# Patient Record
Sex: FEMALE | Race: BLACK OR AFRICAN AMERICAN | URBAN - METROPOLITAN AREA
[De-identification: names, ages, dates, MRNs, and addresses within clinical notes are randomized per-mention and may not be internally consistent; named-entity substitution may affect disease eponyms.]

---

## 2017-01-05 ENCOUNTER — OFFICE VISIT (OUTPATIENT)
Dept: ORTHOPEDICS | Facility: CLINIC | Age: 32
End: 2017-01-05
Payer: COMMERCIAL

## 2017-01-05 ENCOUNTER — HOSPITAL ENCOUNTER (OUTPATIENT)
Dept: LAB | Facility: CLINIC | Age: 32
Discharge: HOME | End: 2017-01-05
Attending: FAMILY MEDICINE | Admitting: FAMILY MEDICINE
Payer: COMMERCIAL

## 2017-01-05 VITALS — BODY MASS INDEX: 22.5 KG/M2 | WEIGHT: 140 LBS | HEIGHT: 66 IN | RESPIRATION RATE: 13 BRPM

## 2017-01-05 DIAGNOSIS — M25.561 ACUTE PAIN OF RIGHT KNEE: ICD-10-CM

## 2017-01-05 DIAGNOSIS — M25.461 EFFUSION, RIGHT KNEE: ICD-10-CM

## 2017-01-05 DIAGNOSIS — M25.461 EFFUSION, RIGHT KNEE: Primary | ICD-10-CM

## 2017-01-05 LAB
APPEARANCE FLD: NORMAL
BASOPHILS NFR FLD MANUAL: 1 %
COLOR FLD: YELLOW
CRYSTALS SNV MICRO: NORMAL
EOSINOPHIL NFR FLD MANUAL: 4 %
LYMPHOCYTES NFR FLD MANUAL: 26 %
MONOS+MACROS NFR FLD MANUAL: 66 %
NEUTS BAND NFR FLD MANUAL: 3 %
SPECIMEN SOURCE FLD: NORMAL
WBC # FLD AUTO: NORMAL /UL

## 2017-01-05 PROCEDURE — 89060 EXAM SYNOVIAL FLUID CRYSTALS: CPT | Performed by: FAMILY MEDICINE

## 2017-01-05 PROCEDURE — 89051 BODY FLUID CELL COUNT: CPT | Performed by: FAMILY MEDICINE

## 2017-01-05 PROCEDURE — 99204 OFFICE O/P NEW MOD 45 MIN: CPT | Mod: 25 | Performed by: FAMILY MEDICINE

## 2017-01-05 PROCEDURE — 87070 CULTURE OTHR SPECIMN AEROBIC: CPT | Performed by: FAMILY MEDICINE

## 2017-01-05 PROCEDURE — 20611 DRAIN/INJ JOINT/BURSA W/US: CPT | Mod: RT | Performed by: FAMILY MEDICINE

## 2017-01-05 RX ORDER — METHYLPREDNISOLONE ACETATE 40 MG/ML
40 INJECTION, SUSPENSION INTRA-ARTICULAR; INTRALESIONAL; INTRAMUSCULAR; SOFT TISSUE ONCE
Qty: 1 ML | Refills: 0 | Status: CANCELLED | OUTPATIENT
Start: 2017-01-05 | End: 2017-01-05

## 2017-01-05 RX ORDER — NAPROXEN 500 MG/1
500 TABLET ORAL 2 TIMES DAILY WITH MEALS
Qty: 14 TABLET | Refills: 1 | Status: SHIPPED | OUTPATIENT
Start: 2017-01-05

## 2017-01-05 NOTE — PATIENT INSTRUCTIONS
Thank you for allowing us to participate in your care today.  Please find below your visit diagnosis and the plan going forward.    1. Effusion, right knee    2. Acute pain of right knee      Activity modification as discussed  Given a patellar stabilizing brace  Drainage of the right knee was performed today in clinic. Fluid was sent to the lab for analysis.  Shown how to sign up for MyChart  If fluid is negative for gout or infection and pain is not improved then would recommend an MRI  Ok to use Ibuprofen for pain    Follow up in 1-2 weeks (sooner if needed; call direct clinic number [[phone]] at any time with questions or concerns)    Augustus Knight DO CAQSM  [facility]  Website: [email or web address]  Twitter: [email or web address]

## 2017-01-05 NOTE — Clinical Note
[facility]  [address]  [address]  [place] [address]  [phone]     January 5, 2017    Antonietta Hays  1985  [address]  [place] [address]      To Whom It May Concern:    Antonietta was seen in clinic today for    Effusion, right knee  Acute pain of right knee.     Antonietta may return to work on or about 1/5/17 with the following: Please allow her to rest her knee as needed through her work shift. She could also use a stool to help rest her knee as needed. These restrictions will be valid from 1/5/17 to 1/19/17.    Antonietta will schedule to follow up in clinic prior to the above mentioned end date for re-evaluation of    Effusion, right knee  Acute pain of right knee and review of work restrictions.    Please feel free to contact myself or my Clinic Coordinator, Mendoza Varma, with any questions or concerns at the above number.        Sincerely,            Augustus Knight DO, CAM  Mendoza Varma [facility], MAEd on behalf of Dr. Knight

## 2017-01-05 NOTE — PROGRESS NOTES
ASSESSMENT & PLAN    ICD-10-CM    1. Effusion, right knee M25.461 HC ARTHROCENTESIS ASPIR&/INJ MAJOR JT/BURSA W/US     naproxen (NAPROSYN) 500 MG tablet   2. Acute pain of right knee M25.561 HC ARTHROCENTESIS ASPIR&/INJ MAJOR JT/BURSA W/US     naproxen (NAPROSYN) 500 MG tablet   Activity modification as discussed  Given a patellar stabilizing brace. DC knee immobilizer.  Drainage of the right knee was performed today in clinic. Fluid was sent to the lab for analysis. No cortisone given the nature of the fluid.  Shown how to sign up for MyChart  If fluid is negative for gout or infection and pain is not improved then would recommend an MRI  Rx for Naproxen    Follow up in 1-2 weeks (sooner if needed; call direct clinic number [[phone]] at any time with questions or concerns).  Instructed to call the office if the condition evolves or worsens.    -----    SUBJECTIVE  Antonietta MARAH Hays is a/an 31 year old female who is seen in consultation at the request of Dr. Eliseo Morgan for evaluation of right knee pain. The patient is seen by themselves. An  was used over the phone through [email or web address].    Onset: 2 week(s) ago with increased pain last week. Reports insidious onset without acute precipitating event. She points to the outside of the knee as her spot of pain and she indicates swelling.   Worsened by: standing, She has trouble sleeping due to pain  Better with: unknown  Quality: aching, dull  Pain Scale (maximum/current)/10: 6/10 / 3/10  Treatments tried: ibuprofen, ice  Orthopedic history: NO  Relevant surgical history: NO  Patient Social History: works at coffee shop as a [employer]    Patient's past medical, surgical, social, and family histories were reviewed today and no changes are noted.    REVIEW OF SYSTEMS:  10 point ROS is negative other than symptoms noted above in HPI, Past Medical History or as stated below  Constitutional: NEGATIVE for fever, chills, change in weight  Skin:  "NEGATIVE for worrisome rashes, moles or lesions  GI/: NEGATIVE for bowel or bladder changes  Neuro: NEGATIVE for weakness, dizziness or paresthesias    OBJECTIVE:  Resp 13  Ht 5' 6\" (1.676 m)  Wt 140 lb (63.504 kg)  BMI 22.61 kg/m2  LMP 11/18/2016   General: healthy, alert and in no distress  HEENT: no scleral icterus or conjunctival erythema  Skin: no suspicious lesions or rash. No jaundice.  CV: no pedal edema  Resp: normal respiratory effort without conversational dyspnea   Psych: normal mood and affect  Gait: normal steady gait with appropriate coordination and balance  Neuro: Motor strength as noted below  MSK:  RIGHT KNEE  Inspection:    effusion  Palpation:    Tender about the lateral patellar facet, medial patellar facet and lateral femoral condyle. Remainder of bony and ligamentous landmarks are nontender.    Mild effusion is present    Patellofemoral crepitus is Absent, patellar is balottable  Range of Motion:     -50 extension to 1100 flexion  Strength:    Extensor mechanism intact  Special Tests:    Positive: Patellar grind    Independent visualization of the below image:  KNEE THREE VIEWS RIGHT  12/29/2016 6:45 PM       HISTORY: Right knee pain for three days.  No known injury.  Has  moderate effusion, mild increased warmth.  No fevers.  Pain in right  knee.     COMPARISON: None.     FINDINGS: There is no significant degenerative change. No  suprapatellar effusion. There is no acute fracture. No dislocation.  Sunrise views appear aligned.  There are no worrisome bony lesions.                                                                       IMPRESSION:  No acute osseous abnormality demonstrated.     CHARISSA KNIGHT MD    Right Knee Intra-Articular Aspiration     Diagnoses (preoperative and postoperative):  Right knee pain.  Current Procedure (include preoperative):  Sonographically guided right knee intra-articular aspiration  Current Indication (include preoperative):  Alleviation of " pain  REASON FOR REFERRAL:  Antonietta has requested a right knee intra-articular aspiration to help with modulation of pain. Sonographic guidance will be used to ensure accurate placement of the medication within the joint space as the patient's landmarks are not easily palpated.  PATIENT EDUCATION:  Ready to learn with no apparent learning barriers identified.  Learning preferences include listening. Explained diagnosis and treatment plan as well as treatment alternatives. Patient expressed understanding of the content.  Following denial of allergy and review of potential side effects and complications including but not necessarily limited to infection, bleeding, allergic reaction, post-injection flare, local tissue breakdown, injury to soft tissue and/or nerves and seizure, patient indicated their understanding and agreed to proceed. A written consent was obtained and is scanned into the chart. Written and signed consent obtained and is scanned into the chart.  PROCEDURE:  Prior to the procedure, the right knee joint was examined with a 12 MHz linear transducer to visualize the joint space and determine the approach for the procedure.  Procedure was carried out using sterile technique including Chloraprep scrub, a sterile transducer cover, and sterile transducer gel. A simple surgical tray was used.  PROCEDURAL PAUSE:  Procedural pause conducted to verify correct patient identity, procedure to be performed, and as applicable, correct side/site, correct patient position, availability of implants, special equipment, or special requirements.  Patient position:  Supine  Transducer type:  12 MHz linear array transducer  Approach: Lateral to medial parallel to long axis of transducer  Local Anesthesia:  22 gauge 2.5 inch needle was used to anesthetize the skin and subcutaneous tissue with 5 ml of 1% Lidocaine  Aspiration: Sonographically guided 18 gauge 3.5 inch needle was directly visualized entering down into the right  knee joint.  After confirming needle tip position approximately 13 cc of cloudy fluid was removed from the knee.  AFTERCARE:  Patient tolerated the procedure without complication. After a short observation period, the patient was discharged under their own power and in excellent condition.  Pain noted to be a 3/10 before completion of the procedure and 1/10 after completion of the procedure.    Patient's conditions were thoroughly discussed during today's visit with greater than 50% of the visit spent counseling the patient with total time spent face-to-face with the patient being 25 minutes.    Augustus Knight DO [facility]

## 2017-01-05 NOTE — MR AVS SNAPSHOT
After Visit Summary   1/5/2017    Antonietta Hays    MRN: [id number]           Patient Information     Date Of Birth          1985        Visit Information        Provider Department      1/5/2017 11:20 AM Augustus Knight DO [facility]        Today's Diagnoses     Effusion, right knee    -  1     Acute pain of right knee           Care Instructions    Thank you for allowing us to participate in your care today.  Please find below your visit diagnosis and the plan going forward.    1. Effusion, right knee    2. Acute pain of right knee      Activity modification as discussed  Given a patellar stabilizing brace  Drainage of the right knee was performed today in clinic. Fluid was sent to the lab for analysis.  Shown how to sign up for MyChart  If fluid is negative for gout or infection and pain is not improved then would recommend an MRI    Follow up in 1-2 weeks (sooner if needed; call direct clinic number [[phone]] at any time with questions or concerns)    Augustus Knight DO [facility]  Website: [email or web address]  Twitter: [email or web address]          Follow-ups after your visit        Who to contact     If you have questions or need follow up information about today's clinic visit or your schedule please contact [facility] directly at [phone].  Normal or non-critical lab and imaging results will be communicated to you by MyChart, letter or phone within 4 business days after the clinic has received the results. If you do not hear from us within 7 days, please contact the clinic through MyChart or phone. If you have a critical or abnormal lab result, we will notify you by phone as soon as possible.  Submit refill requests through [employer] or call your pharmacy and they will forward the refill request to us. Please allow 3 business days for your refill to be completed.          Additional Information About  "Your Visit        MyChart Information     NewComLink lets you send messages to your doctor, view your test results, renew your prescriptions, schedule appointments and more. To sign up, go to [email or web address] . Click on \"Log in\" on the left side of the screen, which will take you to the Welcome page. Then click on \"Sign up Now\" on the right side of the page.     You will be asked to enter the access code listed below, as well as some personal information. Please follow the directions to create your username and password.     Your access code is: [email or web address]  Expires: 3/29/2017  7:11 PM     Your access code will  in 90 days. If you need help or a new code, please call your [place] clinic or [phone].        Care EveryWhere ID     This is your Care EveryWhere ID. This could be used by other organizations to access your [facility] medical records  [id number]        Your Vitals Were     Respirations Height BMI (Body Mass Index) Last Period          13 5' 6\" (1.676 m) 22.61 kg/m2 2016         Blood Pressure from Last 3 Encounters:   16 127/89    Weight from Last 3 Encounters:   17 140 lb (63.504 kg)   16 140 lb (63.504 kg)              We Performed the Following     HC ARTHROCENTESIS ASPIR&/INJ MAJOR JT/BURSA W/US        Primary Care Provider    None       No address on file        Thank you!     Thank you for choosing [facility]  for your care. Our goal is always to provide you with excellent care. Hearing back from our patients is one way we can continue to improve our services. Please take a few minutes to complete the written survey that you may receive in the mail after your visit with us. Thank you!             Your Updated Medication List - Protect others around you: Learn how to safely use, store and throw away your medicines at www.disposemymeds.org.          This list is accurate as of: 17 12:06 PM.  Always use your most recent med list.          "          Brand Name Dispense Instructions for use    order for DME     1 Device    Equipment being ordered: knee immobilizer

## 2017-01-05 NOTE — Clinical Note
Antonietta Mcmullen Dr. saw me at [facility] on Jan 5, 2017.  Please refer to the visit note at your convenience and feel free to contact me should you have any questions.  Thank you for the consult. Sincerely,  Augustus Knight DO, [facility]

## 2017-01-05 NOTE — NURSING NOTE
"Chief Complaint   Patient presents with     Musculoskeletal Problem       Initial Resp 13  Ht 5' 6\" (1.676 m)  Wt 140 lb (63.504 kg)  BMI 22.61 kg/m2  LMP 11/18/2016 Estimated body mass index is 22.61 kg/(m^2) as calculated from the following:    Height as of this encounter: 5' 6\" (1.676 m).    Weight as of this encounter: 140 lb (63.504 kg).  BP completed using cuff size: NA (Not Taken)    Mendoza Varma ATC    "

## 2017-01-10 LAB
BACTERIA SPEC CULT: NO GROWTH
MICRO REPORT STATUS: NORMAL
SPECIMEN SOURCE: NORMAL

## 2017-01-19 ENCOUNTER — OFFICE VISIT (OUTPATIENT)
Dept: ORTHOPEDICS | Facility: CLINIC | Age: 32
End: 2017-01-19
Payer: COMMERCIAL

## 2017-01-19 VITALS
DIASTOLIC BLOOD PRESSURE: 84 MMHG | BODY MASS INDEX: 22.5 KG/M2 | SYSTOLIC BLOOD PRESSURE: 118 MMHG | HEIGHT: 66 IN | WEIGHT: 140 LBS

## 2017-01-19 DIAGNOSIS — L24.9 IRRITANT CONTACT DERMATITIS, UNSPECIFIED TRIGGER: Primary | ICD-10-CM

## 2017-01-19 DIAGNOSIS — M22.2X1 PATELLOFEMORAL STRESS SYNDROME OF RIGHT KNEE: ICD-10-CM

## 2017-01-19 PROCEDURE — 99213 OFFICE O/P EST LOW 20 MIN: CPT | Performed by: FAMILY MEDICINE

## 2017-01-19 NOTE — PATIENT INSTRUCTIONS
Thank you for allowing us to participate in your care today.  Please find below your visit diagnosis and the plan going forward.    1. Patellofemoral stress syndrome of right knee      Discussed your exam and recommendations  No imaging given that you've already improved  Referral made to physical therapy    Follow up in 4-6 PT sessions if still having pain / not back to your normal activity.  Sooner if needed. Call direct clinic number [[phone]] at any time with questions or concerns)    In regards to skin itching would recommend a topical cortisone (hydrocortisone) ointment    Augustus Knight DO CAQSM  [facility]  Website: [email or web address]  Twitter: [email or web address]

## 2017-01-19 NOTE — NURSING NOTE
"Chief Complaint   Patient presents with     RECHECK       Initial /84 mmHg  Ht 5' 6\" (1.676 m)  Wt 140 lb (63.504 kg)  BMI 22.61 kg/m2  LMP 11/18/2016 Estimated body mass index is 22.61 kg/(m^2) as calculated from the following:    Height as of this encounter: 5' 6\" (1.676 m).    Weight as of this encounter: 140 lb (63.504 kg).  BP completed using cuff size: osmar Varma ATC    "

## 2017-01-19 NOTE — PROGRESS NOTES
"ASSESSMENT & PLAN    ICD-10-CM    1. Irritant contact dermatitis, unspecified trigger L24.9    2. Patellofemoral stress syndrome of right knee M22.2X1 [facility] PT, HAND, AND CHIROPRACTIC REFERRAL   Doing well and pain is improved  Discussed exam and no imaging given improvement  Referral made to physical therapy    In regards to skin itching would recommend a topical cortisone (hydrocortisone) ointment. Follow-up with PCP if not improved.    Follow up in 4-6 PT sessions if still having pain / not back to your normal activity.  Sooner if needed. Call direct clinic number [[phone]] at any time with questions or concerns)Instructed to call the office if the condition evolves or worsens.    -----    SUBJECTIVE:  Antonietta Hays is a 31 year old female who is seen in follow-up for Effusion, right knee and acute pain of right knee. They were last seen 1/5/2017. She is seen with an .     Since their last visit reports 80% improvement. She reports that it is significantly better. They indicate that their pain level is 0/10. She still has some mild pain with prolonged standing. They have tried rest/activity avoidance.      Patient's past medical, surgical, social, and family histories were reviewed today and no changes are noted.    REVIEW OF SYSTEMS:  Constitutional: NEGATIVE for fever, chills, change in weight  Skin: NEGATIVE for worrisome rashes, moles or lesions  GI/: NEGATIVE for bowel or bladder changes  Neuro: NEGATIVE for weakness, dizziness or paresthesias    OBJECTIVE:  /84 mmHg  Ht 5' 6\" (1.676 m)  Wt 140 lb (63.504 kg)  BMI 22.61 kg/m2  [facility] 11/18/2016   General: healthy, alert and in no distress  HEENT: no scleral icterus or conjunctival erythema  Skin: Are of scaling / papular rash over lateral malleolus/distal fibula.  No jaundice.  CV: regular rhythm by palpation, no pedal edema  Resp: normal respiratory effort without conversational dyspnea   Psych: normal mood and affect  Gait: " normal steady gait with appropriate coordination and balance  Neuro: normal light touch sensory exam of the extremities.  Motor strength as noted below  MSK:  RIGHT KNEE  Inspection:    No effusion  Palpation:    Mildly tender about the lateral patellar facet, medial patellar facet. Remainder of bony and ligamentous landmarks are nontender.    Patellofemoral crepitus is present  Range of Motion:     00 extension to 1350 flexion  Strength:    Extensor mechanism intact  Special Tests:    Positive: Patellar grind    Independent visualization of the below image:  None indicated at this time    Patient's conditions were thoroughly discussed during today's visit with greater than 50% of the visit spent counseling the patient with total time spent face-to-face with the patient being 15 minutes.    Augustus Knight DO [facility]

## 2017-01-19 NOTE — MR AVS SNAPSHOT
After Visit Summary   1/19/2017    Antonietta Hays    MRN: [id number]           Patient Information     Date Of Birth          1985        Visit Information        Provider Department      1/19/2017 10:45 AM Augustus Knight DO; [employer] [facility] MEDIC[facility]        Today's Diagnoses     Patellofemoral stress syndrome of right knee           Care Instructions    Thank you for allowing us to participate in your care today.  Please find below your visit diagnosis and the plan going forward.    1. Patellofemoral stress syndrome of right knee      Discussed your exam and recommendations  No imaging given that you've already improved  Referral made to physical therapy    Follow up in 4-6 PT sessions if still having pain / not back to your normal activity.  Sooner if needed. Call direct clinic number [[phone]] at any time with questions or concerns)    In regards to skin itching would recommend a topical cortisone (hydrocortisone) ointment    Augustus Knight DO [facility]  Website: [email or web address]  Twitter: [email or web address]          Follow-ups after your visit        Additional Services     [facility] PT, HAND, AND CHIR[facility]RACTIC REFERRAL       **This order will print in the [facility] Scheduling Office**    Physical Therapy, Hand Therapy and Chiropractic Care are available through:    *[facility]  *[facility]  *[facility]    Call one number to schedule at any of the above locations: [phone].    Your provider has referred you to: Physical Therapy at [facility] or [facility]    Indication/Reason for Referral: Knee Pain - PFPS  Onset of Illness: see chart  Therapy Orders: Evaluate and Treat  Special Programs: None  Special Request: None    Ray Bell      Additional Comments for the Therapist or Chiropractor: Formal physical therapy - exercises to include neuromuscular/proprioceptive control  "and VMO strengthening. Please also include pain-free closed chain/isometric/isotonic strengthening with use of modalities (including kinesioptaping) as needed/deemed appropriate with home exercise prescription.        Please be aware that coverage of these services is subject to the terms and limitations of your health insurance plan.  Call member services at your health plan with any benefit or coverage questions.      Please bring the following to your appointment:    *Your personal calendar for scheduling future appointments  *Comfortable clothing                  Who to contact     If you have questions or need follow up information about today's clinic visit or your schedule please contact [facility] MEDICINE directly at [phone].  Normal or non-critical lab and imaging results will be communicated to you by Nichehart, letter or phone within 4 business days after the clinic has received the results. If you do not hear from us within 7 days, please contact the clinic through NetClarityt or phone. If you have a critical or abnormal lab result, we will notify you by phone as soon as possible.  Submit refill requests through [employer] or call your pharmacy and they will forward the refill request to us. Please allow 3 business days for your refill to be completed.          Additional Information About Your Visit        [employer] Information     [employer] lets you send messages to your doctor, view your test results, renew your prescriptions, schedule appointments and more. To sign up, go to [email or web address] . Click on \"Log in\" on the left side of the screen, which will take you to the Welcome page. Then click on \"Sign up Now\" on the right side of the page.     You will be asked to enter the access code listed below, as well as some personal information. Please follow the directions to create your username and password.     Your access code is: [email or web address]  Expires: 3/29/2017  7:11 PM     Your access " "code will  in 90 days. If you need help or a new code, please call your [place] clinic or [phone].        Care EveryWhere ID     This is your Care EveryWhere ID. This could be used by other organizations to access your [place] medical records  [id number]        Your Vitals Were     Height BMI (Body Mass Index) Last Period             5' 6\" (1.676 m) 22.61 kg/m2 2016          Blood Pressure from Last 3 Encounters:   17 118/84   16 127/89    Weight from Last 3 Encounters:   17 140 lb (63.504 kg)   17 140 lb (63.504 kg)   16 140 lb (63.504 kg)              We Performed the Following     [facility] PT, HAND, AND CHIROPRACTIC REFERRAL        Primary Care Provider    None       No address on file        Thank you!     Thank you for choosing [facility]  for your care. Our goal is always to provide you with excellent care. Hearing back from our patients is one way we can continue to improve our services. Please take a few minutes to complete the written survey that you may receive in the mail after your visit with us. Thank you!             Your Updated Medication List - Protect others around you: Learn how to safely use, store and throw away your medicines at www.disposemymeds.org.          This list is accurate as of: 17 12:03 PM.  Always use your most recent med list.                   Brand Name Dispense Instructions for use    naproxen 500 MG tablet    NAPROSYN    14 tablet    Take 1 tablet (500 mg) by mouth 2 times daily (with meals)       * order for DME     1 Device    Equipment being ordered: knee immobilizer       * order for DME     1 Device    Equipment being ordered: The following items were dispensed: Knee Brace - Reaction medium RIGHT       * Notice:  This list has 2 medication(s) that are the same as other medications prescribed for you. Read the directions carefully, and ask your doctor or other care provider to review them with you.    "

## 2017-01-26 ENCOUNTER — THERAPY VISIT (OUTPATIENT)
Dept: PHYSICAL THERAPY | Facility: CLINIC | Age: 32
End: 2017-01-26
Payer: COMMERCIAL

## 2017-01-26 DIAGNOSIS — M25.561 CHRONIC PAIN OF RIGHT KNEE: Primary | ICD-10-CM

## 2017-01-26 DIAGNOSIS — G89.29 CHRONIC PAIN OF RIGHT KNEE: Primary | ICD-10-CM

## 2017-01-26 PROCEDURE — 97161 PT EVAL LOW COMPLEX 20 MIN: CPT | Mod: GP | Performed by: PHYSICAL THERAPIST

## 2017-01-26 PROCEDURE — 97112 NEUROMUSCULAR REEDUCATION: CPT | Mod: GP | Performed by: PHYSICAL THERAPIST

## 2017-01-26 PROCEDURE — 97110 THERAPEUTIC EXERCISES: CPT | Mod: GP | Performed by: PHYSICAL THERAPIST

## 2017-01-26 ASSESSMENT — ACTIVITIES OF DAILY LIVING (ADL)
RAW_SCORE: 65
STAND: ACTIVITY IS NOT DIFFICULT
SWELLING: I DO NOT HAVE THE SYMPTOM
PAIN: I HAVE THE SYMPTOM BUT IT DOES NOT AFFECT MY ACTIVITY
HOW_WOULD_YOU_RATE_THE_CURRENT_FUNCTION_OF_YOUR_KNEE_DURING_YOUR_USUAL_DAILY_ACTIVITIES_ON_A_SCALE_FROM_0_TO_100_WITH_100_BEING_YOUR_LEVEL_OF_KNEE_FUNCTION_PRIOR_TO_YOUR_INJURY_AND_0_BEING_THE_INABILITY_TO_PERFORM_ANY_OF_YOUR_USUAL_DAILY_ACTIVITIES?: 80
KNEE_ACTIVITY_OF_DAILY_LIVING_SUM: 65
RISE FROM A CHAIR: ACTIVITY IS NOT DIFFICULT
LIMPING: I DO NOT HAVE THE SYMPTOM
GO DOWN STAIRS: ACTIVITY IS NOT DIFFICULT
GIVING WAY, BUCKLING OR SHIFTING OF KNEE: I DO NOT HAVE THE SYMPTOM
KNEE_ACTIVITY_OF_DAILY_LIVING_SCORE: 92.86
WALK: ACTIVITY IS NOT DIFFICULT
GO UP STAIRS: ACTIVITY IS NOT DIFFICULT
SIT WITH YOUR KNEE BENT: ACTIVITY IS NOT DIFFICULT
KNEEL ON THE FRONT OF YOUR KNEE: ACTIVITY IS SOMEWHAT DIFFICULT
HOW_WOULD_YOU_RATE_THE_OVERALL_FUNCTION_OF_YOUR_KNEE_DURING_YOUR_USUAL_DAILY_ACTIVITIES?: NEARLY NORMAL
SQUAT: ACTIVITY IS SOMEWHAT DIFFICULT
STIFFNESS: I DO NOT HAVE THE SYMPTOM
AS_A_RESULT_OF_YOUR_KNEE_INJURY,_HOW_WOULD_YOU_RATE_YOUR_CURRENT_LEVEL_OF_DAILY_ACTIVITY?: NEARLY NORMAL
WEAKNESS: I DO NOT HAVE THE SYMPTOM

## 2017-01-26 NOTE — PROGRESS NOTES
Subjective:            Patient is a 32 yo female with complaints of improving, intermittent posterior R knee pain with onset December 2016 for no apparent reason.  Was seen by Dr. Knight and knee was aspirated with improvement in her condition.  Was issued knee brace which has been helpful but is not needing it as much.  Mechanical stresses: standing/walking.  Leisure: not a formal exerciser.  WORSE: standing, prolonged walking, squatting.  BETTER: rest.  VAS: 0/10 at rest, 5/10 with standing, walking.  No previous ortho history.  PMH: none sigificant.  MEDS: none.  Imaging: xrays.  General health is good, red flags are negative. .                                                                      Objective:    Sites for physical examination: R knee    OBJECTIVE EXAMINATION:    POSTURE:    Sitting: fair  Correction of Posture: no effect  Standing Posture: n/a    NEUROLOGICAL (motor/sensory/reflex/dural; if applicable): n/a    BASELINES: (pain or functional activity): squatting    EXTREMITIES: (Hip / Knee / Ankle / Foot): R knee    MOVEMENT LOSS  Yang Mod Min  Nil Pain   Flexion   x  Some pain with op   Extension    x    Dorsiflexion        Plantarflexion        Adduction/  Inversion        Abduction /  Eversion        IR        ER                          Passive Movement (note with/without overpressure, symptom and range, PDM/ERP): erp with passive R knee op (anterior pain)  Resisted Test Response (pain): 5/5 MMT, pain free during quad testing, some pain with release   Other Tests: patellar mobility: incr mobility lateral, incr lateral tracking      Provisional Classification: other  Extremity or Spine: R knee  Principle of Management (education/equipment/mechanical therapy/specific principle): R hip strengthening, ITB stretching            Flexibility/Screens:       Lower Extremity:      Decreased right lower extremity flexibility:  IT Band                                                 Hip Evaluation    Hip  Strength:      Extension:  Left: 4+/5  Pain:Right: 4/5    Pain:    Abduction:  Left: 4/5     Pain:Right: 4-/5    Pain:                                 General     ROS    Assessment/Plan:      Patient is a 31 year old female with right side knee complaints.    Patient has the following significant findings with corresponding treatment plan.                Diagnosis 1:  R knee pain  Pain -  manual therapy, self management, education, directional preference exercise and home program  Decreased ROM/flexibility - manual therapy and therapeutic exercise  Decreased strength - therapeutic exercise and therapeutic activities  Decreased function - therapeutic activities    Therapy Evaluation Codes:   1) History comprised of:   Personal factors that impact the plan of care:      None.    Comorbidity factors that impact the plan of care are:      None.     Medications impacting care: None.  2) Examination of Body Systems comprised of:   Body structures and functions that impact the plan of care:      Knee.   Activity limitations that impact the plan of care are:      Standing and Walking.  3) Clinical presentation characteristics are:   Stable/Uncomplicated.  4) Decision-Making    Low complexity using standardized patient assessment instrument and/or measureable assessment of functional outcome.  Cumulative Therapy Evaluation is: Low complexity.    Previous and current functional limitations:  (See Goal Flow Sheet for this information)    Short term and Long term goals: (See Goal Flow Sheet for this information)     Communication ability:  Patient appears to be able to clearly communicate and understand verbal and written communication and follow directions correctly.  Treatment Explanation - The following has been discussed with the patient:   RX ordered/plan of care  Anticipated outcomes  Possible risks and side effects  This patient would benefit from PT intervention to resume normal activities.   Rehab potential is  good.    Frequency:  1 X week, once daily  Duration:  for 4 weeks  Discharge Plan:  Achieve all LTG.  Independent in home treatment program.  Reach maximal therapeutic benefit.    Please refer to the daily flowsheet for treatment today, total treatment time and time spent performing 1:1 timed codes.

## 2017-02-02 ENCOUNTER — THERAPY VISIT (OUTPATIENT)
Dept: PHYSICAL THERAPY | Facility: CLINIC | Age: 32
End: 2017-02-02
Payer: COMMERCIAL

## 2017-02-02 DIAGNOSIS — G89.29 CHRONIC PAIN OF RIGHT KNEE: Primary | ICD-10-CM

## 2017-02-02 DIAGNOSIS — M25.561 CHRONIC PAIN OF RIGHT KNEE: Primary | ICD-10-CM

## 2017-02-02 PROCEDURE — 97110 THERAPEUTIC EXERCISES: CPT | Mod: GP | Performed by: PHYSICAL THERAPIST

## 2017-02-02 PROCEDURE — 97112 NEUROMUSCULAR REEDUCATION: CPT | Mod: GP | Performed by: PHYSICAL THERAPIST
